# Patient Record
Sex: FEMALE | URBAN - METROPOLITAN AREA
[De-identification: names, ages, dates, MRNs, and addresses within clinical notes are randomized per-mention and may not be internally consistent; named-entity substitution may affect disease eponyms.]

---

## 2018-04-18 ENCOUNTER — RECORDS - HEALTHEAST (OUTPATIENT)
Dept: LAB | Facility: CLINIC | Age: 73
End: 2018-04-18

## 2018-04-19 LAB
ANION GAP SERPL CALCULATED.3IONS-SCNC: 8 MMOL/L (ref 5–18)
BUN SERPL-MCNC: 20 MG/DL (ref 8–28)
CALCIUM SERPL-MCNC: 9.1 MG/DL (ref 8.5–10.5)
CHLORIDE BLD-SCNC: 104 MMOL/L (ref 98–107)
CO2 SERPL-SCNC: 28 MMOL/L (ref 22–31)
CREAT SERPL-MCNC: 0.91 MG/DL (ref 0.6–1.1)
GFR SERPL CREATININE-BSD FRML MDRD: 60 ML/MIN/1.73M2
GLUCOSE BLD-MCNC: 102 MG/DL (ref 70–125)
POTASSIUM BLD-SCNC: 3.5 MMOL/L (ref 3.5–5)
SODIUM SERPL-SCNC: 140 MMOL/L (ref 136–145)

## 2018-04-20 ENCOUNTER — RECORDS - HEALTHEAST (OUTPATIENT)
Dept: LAB | Facility: CLINIC | Age: 73
End: 2018-04-20

## 2018-04-23 LAB
ANION GAP SERPL CALCULATED.3IONS-SCNC: 10 MMOL/L (ref 5–18)
BUN SERPL-MCNC: 11 MG/DL (ref 8–28)
CALCIUM SERPL-MCNC: 9.2 MG/DL (ref 8.5–10.5)
CHLORIDE BLD-SCNC: 100 MMOL/L (ref 98–107)
CO2 SERPL-SCNC: 29 MMOL/L (ref 22–31)
CREAT SERPL-MCNC: 0.82 MG/DL (ref 0.6–1.1)
ERYTHROCYTE [DISTWIDTH] IN BLOOD BY AUTOMATED COUNT: 13.9 % (ref 11–14.5)
GFR SERPL CREATININE-BSD FRML MDRD: >60 ML/MIN/1.73M2
GLUCOSE BLD-MCNC: 91 MG/DL (ref 70–125)
HCT VFR BLD AUTO: 35.6 % (ref 35–47)
HGB BLD-MCNC: 10.8 G/DL (ref 12–16)
MCH RBC QN AUTO: 27.3 PG (ref 27–34)
MCHC RBC AUTO-ENTMCNC: 30.3 G/DL (ref 32–36)
MCV RBC AUTO: 90 FL (ref 80–100)
PLATELET # BLD AUTO: 438 THOU/UL (ref 140–440)
PMV BLD AUTO: 9.9 FL (ref 8.5–12.5)
POTASSIUM BLD-SCNC: 5 MMOL/L (ref 3.5–5)
RBC # BLD AUTO: 3.96 MILL/UL (ref 3.8–5.4)
SODIUM SERPL-SCNC: 139 MMOL/L (ref 136–145)
WBC: 10.4 THOU/UL (ref 4–11)

## 2018-04-27 ENCOUNTER — RECORDS - HEALTHEAST (OUTPATIENT)
Dept: LAB | Facility: CLINIC | Age: 73
End: 2018-04-27

## 2018-04-27 LAB
ANION GAP SERPL CALCULATED.3IONS-SCNC: 13 MMOL/L (ref 5–18)
BASOPHILS # BLD AUTO: 0.1 THOU/UL (ref 0–0.2)
BASOPHILS NFR BLD AUTO: 0 % (ref 0–2)
BUN SERPL-MCNC: 11 MG/DL (ref 8–28)
CALCIUM SERPL-MCNC: 9.2 MG/DL (ref 8.5–10.5)
CHLORIDE BLD-SCNC: 101 MMOL/L (ref 98–107)
CO2 SERPL-SCNC: 23 MMOL/L (ref 22–31)
CREAT SERPL-MCNC: 0.86 MG/DL (ref 0.6–1.1)
EOSINOPHIL # BLD AUTO: 0.2 THOU/UL (ref 0–0.4)
EOSINOPHIL NFR BLD AUTO: 2 % (ref 0–6)
ERYTHROCYTE [DISTWIDTH] IN BLOOD BY AUTOMATED COUNT: 14.2 % (ref 11–14.5)
GFR SERPL CREATININE-BSD FRML MDRD: >60 ML/MIN/1.73M2
GLUCOSE BLD-MCNC: 96 MG/DL (ref 70–125)
HCT VFR BLD AUTO: 38.5 % (ref 35–47)
HGB BLD-MCNC: 11.5 G/DL (ref 12–16)
LYMPHOCYTES # BLD AUTO: 1.3 THOU/UL (ref 0.8–4.4)
LYMPHOCYTES NFR BLD AUTO: 11 % (ref 20–40)
MCH RBC QN AUTO: 27.2 PG (ref 27–34)
MCHC RBC AUTO-ENTMCNC: 29.9 G/DL (ref 32–36)
MCV RBC AUTO: 91 FL (ref 80–100)
MONOCYTES # BLD AUTO: 1.1 THOU/UL (ref 0–0.9)
MONOCYTES NFR BLD AUTO: 10 % (ref 2–10)
NEUTROPHILS # BLD AUTO: 8.9 THOU/UL (ref 2–7.7)
NEUTROPHILS NFR BLD AUTO: 77 % (ref 50–70)
PLATELET # BLD AUTO: 473 THOU/UL (ref 140–440)
PMV BLD AUTO: 10.2 FL (ref 8.5–12.5)
POTASSIUM BLD-SCNC: 3.9 MMOL/L (ref 3.5–5)
RBC # BLD AUTO: 4.23 MILL/UL (ref 3.8–5.4)
SODIUM SERPL-SCNC: 137 MMOL/L (ref 136–145)
WBC: 11.6 THOU/UL (ref 4–11)

## 2020-09-08 ENCOUNTER — HOSPITAL ENCOUNTER (EMERGENCY)
Facility: CLINIC | Age: 75
Discharge: HOME | End: 2020-09-09
Attending: FAMILY MEDICINE | Admitting: FAMILY MEDICINE
Payer: MEDICARE

## 2020-09-08 VITALS
TEMPERATURE: 97.4 F | OXYGEN SATURATION: 93 % | HEART RATE: 77 BPM | RESPIRATION RATE: 16 BRPM | DIASTOLIC BLOOD PRESSURE: 80 MMHG | SYSTOLIC BLOOD PRESSURE: 157 MMHG

## 2020-09-08 DIAGNOSIS — G89.4 CHRONIC PAIN SYNDROME: ICD-10-CM

## 2020-09-08 DIAGNOSIS — M25.571 PAIN IN JOINT, ANKLE AND FOOT, RIGHT: ICD-10-CM

## 2020-09-08 DIAGNOSIS — F32.1 CURRENT MODERATE EPISODE OF MAJOR DEPRESSIVE DISORDER, UNSPECIFIED WHETHER RECURRENT (H): ICD-10-CM

## 2020-09-08 DIAGNOSIS — F43.0 ACUTE REACTION TO STRESS: ICD-10-CM

## 2020-09-08 LAB
ALBUMIN UR-MCNC: 100 MG/DL
APPEARANCE UR: CLEAR
BILIRUB UR QL STRIP: NEGATIVE
COLOR UR AUTO: YELLOW
GLUCOSE UR STRIP-MCNC: NEGATIVE MG/DL
HGB UR QL STRIP: NEGATIVE
KETONES UR STRIP-MCNC: NEGATIVE MG/DL
LEUKOCYTE ESTERASE UR QL STRIP: ABNORMAL
NITRATE UR QL: NEGATIVE
PH UR STRIP: 6 PH (ref 5–7)
RBC #/AREA URNS AUTO: 0 /HPF (ref 0–2)
SOURCE: ABNORMAL
SP GR UR STRIP: 1.01 (ref 1–1.03)
UROBILINOGEN UR STRIP-MCNC: 0 MG/DL (ref 0–2)
WBC #/AREA URNS AUTO: 6 /HPF (ref 0–5)

## 2020-09-08 PROCEDURE — 99285 EMERGENCY DEPT VISIT HI MDM: CPT | Mod: 25 | Performed by: FAMILY MEDICINE

## 2020-09-08 PROCEDURE — 25000132 ZZH RX MED GY IP 250 OP 250 PS 637: Mod: GY | Performed by: FAMILY MEDICINE

## 2020-09-08 PROCEDURE — 81001 URINALYSIS AUTO W/SCOPE: CPT | Performed by: FAMILY MEDICINE

## 2020-09-08 PROCEDURE — 25000128 H RX IP 250 OP 636: Performed by: FAMILY MEDICINE

## 2020-09-08 PROCEDURE — 96372 THER/PROPH/DIAG INJ SC/IM: CPT | Mod: 59 | Performed by: FAMILY MEDICINE

## 2020-09-08 PROCEDURE — 90791 PSYCH DIAGNOSTIC EVALUATION: CPT

## 2020-09-08 PROCEDURE — 99285 EMERGENCY DEPT VISIT HI MDM: CPT | Mod: Z6 | Performed by: FAMILY MEDICINE

## 2020-09-08 RX ORDER — LIDOCAINE 4 G/G
1 PATCH TOPICAL ONCE
Status: DISCONTINUED | OUTPATIENT
Start: 2020-09-08 | End: 2020-09-09 | Stop reason: HOSPADM

## 2020-09-08 RX ORDER — LORAZEPAM 0.5 MG/1
0.5 TABLET ORAL ONCE
Status: COMPLETED | OUTPATIENT
Start: 2020-09-08 | End: 2020-09-08

## 2020-09-08 RX ORDER — HYDROMORPHONE HYDROCHLORIDE 2 MG/1
2 TABLET ORAL
Status: DISCONTINUED | OUTPATIENT
Start: 2020-09-08 | End: 2020-09-09 | Stop reason: HOSPADM

## 2020-09-08 RX ORDER — AMLODIPINE BESYLATE 5 MG/1
10 TABLET ORAL ONCE
Status: COMPLETED | OUTPATIENT
Start: 2020-09-08 | End: 2020-09-08

## 2020-09-08 RX ORDER — HYDROMORPHONE HYDROCHLORIDE 2 MG/1
2 TABLET ORAL ONCE
Status: COMPLETED | OUTPATIENT
Start: 2020-09-08 | End: 2020-09-08

## 2020-09-08 RX ADMIN — LORAZEPAM 0.5 MG: 0.5 TABLET ORAL at 19:44

## 2020-09-08 RX ADMIN — HYDROMORPHONE HYDROCHLORIDE 1 MG: 1 INJECTION, SOLUTION INTRAMUSCULAR; INTRAVENOUS; SUBCUTANEOUS at 19:46

## 2020-09-08 RX ADMIN — HYDROMORPHONE HYDROCHLORIDE 2 MG: 2 TABLET ORAL at 22:31

## 2020-09-08 RX ADMIN — LIDOCAINE 1 PATCH: 560 PATCH PERCUTANEOUS; TOPICAL; TRANSDERMAL at 19:46

## 2020-09-08 RX ADMIN — LORAZEPAM 0.5 MG: 0.5 TABLET ORAL at 23:10

## 2020-09-08 RX ADMIN — HYDROMORPHONE HYDROCHLORIDE 2 MG: 2 TABLET ORAL at 23:10

## 2020-09-08 RX ADMIN — AMLODIPINE BESYLATE 10 MG: 5 TABLET ORAL at 19:44

## 2020-09-08 NOTE — ED AVS SNAPSHOT
[facility] Emergency Department  [address]   [place] [address]  Phone:  [phone]  Fax:  [phone]                                    Kassie Taylor   MRN: [id number]    Department:  [facility] Emergency Department   Date of Visit:  9/8/2020           After Visit Summary Signature Page    I have received my discharge instructions, and my questions have been answered. I have discussed any challenges I see with this plan with the nurse or doctor.    ..........................................................................................................................................  Patient/Patient Representative Signature      ..........................................................................................................................................  Patient Representative Print Name and Relationship to Patient    ..................................................               ................................................  Date                                   Time    ..........................................................................................................................................  Reviewed by Signature/Title    ...................................................              ..............................................  Date                                               Time          22EPIC Rev 08/18

## 2020-09-09 PROCEDURE — 25000128 H RX IP 250 OP 636: Performed by: FAMILY MEDICINE

## 2020-09-09 PROCEDURE — 96374 THER/PROPH/DIAG INJ IV PUSH: CPT | Performed by: FAMILY MEDICINE

## 2020-09-09 RX ORDER — OLANZAPINE 10 MG/2ML
5 INJECTION, POWDER, FOR SOLUTION INTRAMUSCULAR
Status: COMPLETED | OUTPATIENT
Start: 2020-09-09 | End: 2020-09-09

## 2020-09-09 RX ADMIN — OLANZAPINE 5 MG: 10 INJECTION, POWDER, LYOPHILIZED, FOR SOLUTION INTRAMUSCULAR at 00:03

## 2020-09-09 NOTE — ED NOTES
"Pt daughter Lin called to state pt is on a \"72 hour hold.\" Lin notified pt is not on a 72 hour hold and has been assessed by our provider. Lin notified pt was instructed by Dr. Hodge to call family/friends for a ride home and to stay the night there. Daughter also notified pt would have to go back to the facility if no one is able to take her. Lin said \"ok, well good luck with that.\" Dr. Hodge notified.  "

## 2020-09-09 NOTE — ED TRIAGE NOTES
Pt arrives to ED from [facility] in [place], She is an [facility] Pt but [place] is on [address]. She Was admitted to [facility] because her daughter who was caring for her could not care for her due to the aggression and the  Combativeness.Today had rammed staff with her wheelchair and later threatened to harm staff and her family. Then she threatened to run out into the traffic and get ran over. Patient's airway, breathing, circulation, and disability/mental status (ABCDs) intact/WDL during triage. Her daughter is in the lobby.

## 2020-09-09 NOTE — DISCHARGE INSTRUCTIONS
We have no other option but to have you return to the nursing home.   You can work with your family and  to move back to [facility].  Ice the ankle frequently, use your ankle splint to help with pain and walking.  Follow-up with your primary care provider within the next 3 days for further treatment of your ankle, and back pain.

## 2020-09-09 NOTE — ED NOTES
DEC assessment at this time.  Pillows placed under pt's right ankle and knee.  Additional warm blanket provided.

## 2020-09-09 NOTE — ED NOTES
Pt requesting to use the bedpan.  Pt able to void on the bedpan.  Pt questioning the use of oral pain medication, explained the provider ordered oral vs injection.  Contacted [facility] about potential for pt to return, explained pt has been seen by an MD and assessed by a mental health provider.  Nurse at the nursing home stated that she needed to contact administration and would call the hospital back.

## 2020-09-09 NOTE — ED PROVIDER NOTES
[facility] ED Provider Note   Patient: Kassie Taylor  MRN #:  [id number]  Date of Visit: September 8, 2020    CC:     Chief Complaint   Patient presents with     Aggressive Behavior     HPI:  Kassie Taylor is a 75 year old female who presented to the emergency department by EMS from [facility] in [place].  Patient states that she has been a resident there for the last 1-2 months, and was transferred from another nursing home facility following her her stroke 9 months ago.  Patient was supposed to go to the [facility], but they were on divert.  Patient had a history of hemiplegia and hemiparesis following cerebral infarction affecting the right dominant side with onset dated 8/120.  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  Patient states that she is here in the emergency department because she was aggravated after fighting with her daughter all day.  She made some statements that she wanted to run out onto the highway, but did not have any intention of hurting herself.  Her records indicate that she also made some homicidal threats to staff.  Patient was transferred to the emergency department due to aggressive behavior.  She states that her son who lives in [place] is on his way up to [place] to get her in to bring her back to live with him.  Patient reports current symptoms of headache and back pain.  She is asking for  her blood pressure medication that she takes in the evening.    Problem List:  There are no active problems to display for this patient.      No past medical history on file.    MEDS: No current outpatient medications on file.      ALLERGIES:    Allergies   Allergen Reactions     Cholera Vaccines      Latex        No past surgical history on file.    Social History     Tobacco Use     Smoking status: Not on file   Substance Use Topics     Alcohol use: Not on file     Drug use: Not on file         Review of Systems   Except as noted in HPI, all other systems were reviewed and are negative    Physical Exam   Vitals were reviewed  Patient Vitals for the past 12 hrs:   BP Temp Temp src Pulse Resp SpO2   09/08/20 2130 (!) 157/80 -- -- 77 -- --   09/08/20 2100 (!) 153/67 -- -- 76 -- --   09/08/20 2030 (!) 155/75 -- -- 81 -- --   09/08/20 2000 (!) 177/99 -- -- 84 -- 93 %   09/08/20 1945 (!) 210/89 -- -- -- -- 94 %   09/08/20 1930 (!) 196/99 97.4  F (36.3  C) Oral 88 16 94 %     GENERAL APPEARANCE: Alert, hard of hearing, patient appears aggravated  FACE: normal facies  EYES: Pupils are equal  HENT: normal external exam  NECK: no adenopathy or asymmetry  RESP: normal respiratory effort; clear breath sounds bilaterally  CV: regular rate and rhythm; no significant murmurs, gallops or rubs  ABD: soft, no tenderness; no rebound or guarding; bowel sounds are normal  MS: no gross deformities noted; normal muscle tone.  EXT: No calf tenderness or pitting edema  SKIN: no worrisome rash  NEURO: no facial droop;   PSYCH: She denies any suicidal or homicidal plans.  She admits to making statements to run out on the street and is currently angry at her daughter.      Available Lab/Imaging Results     Results for orders placed or performed during the hospital encounter of 09/08/20 (from the past 24 hour(s))   UA reflex to Microscopic   Result Value Ref Range    Color Urine Yellow     Appearance Urine Clear     Glucose Urine Negative  NEG^Negative mg/dL    Bilirubin Urine Negative NEG^Negative    Ketones Urine Negative NEG^Negative mg/dL    Specific Gravity Urine 1.011 1.003 - 1.035    Blood Urine Negative NEG^Negative    pH Urine 6.0 5.0 - 7.0 pH    Protein Albumin Urine 100 (A) NEG^Negative mg/dL    Urobilinogen mg/dL 0.0 0.0 - 2.0 mg/dL    Nitrite Urine Negative NEG^Negative    Leukocyte Esterase Urine Trace (A) NEG^Negative    Source Midstream Urine     RBC Urine 0 0 - 2 /HPF    WBC Urine 6 (H) 0 - 5 /HPF              Impression     Final diagnoses:   Acute reaction to stress   Chronic pain syndrome   Current moderate episode of major depressive disorder   Pain in joint, ankle and foot, right         ED Course & Medical Decision Making   Kassie Taylor is a 75 year old female who presented to the emergency department by EMS from [facility] in [place].  Patient apparently was fighting with her daughter all day, and became very angry and upset, and threatened to run out on the road and get hit.  She has a history of stroke and had some hemiplegia and hemiparesis which she has recovered from 9 months ago.  She is able to get around with a 4 wheeled walker.  Recently she has had some pain and swelling in her right ankle but she has been pushing through the pain and trying to ambulate.  Patient wanted to go home with her daughter today from the nursing home and when she found out that she could not, became even more upset and apparently threatening staff.  She denied this allegation.  She did admit to threatening to run out onto the highway but does not have any intention of hurting herself.  Patient was complaining of a headache, had increased back and ankle pain and she was given a dose of Dilaudid 1 mg IM in the ED.  She also received Ativan 0.5 mg orally.  Patient had ice applied to her ankle, and subsequently an Aircast.  Patient had an evaluation through the DEC (diagnostic evaluation Center), and it was determined  that she was not a candidate for inpatient treatment.  She did not have any ongoing thoughts of hurting herself and she was more angry and upset.  Patient may have an underlying personality disorder.  The patient's daughter was also contacted and interviewed, and it appears that the patient's son from [place] is not coming up to get her.  We contacted the nursing home and they are able to take her back.  We tried to make arrangements for her friends in [facility] to come get her but there was no one that would be willing to come and get her and be responsible for her.  Patient was given additional Dilaudid p.o. for pain, and another dose of Ativan for her agitation.  Patient became more agitated after she was informed that she had to go back to the nursing home.  There were no other options for her and we contacted the nursing home to make sure that they could take her back.  I spoke briefly with the patient's daughter.  Patient was still very defiant and we offered her a shot for anxiety and pain.  Patient received Zyprexa 5 mg IM and was agreeable to return at this time.  She can work with the nursing home to go back to [facility] in [place], but we are unable to make any arrangements tonight.    Patient was cooperative getting on the ambulance cot to return to [facility]..  Follow-up with her primary care provider in 2-3 days for further management of her chronic pain.        Medications   Lidocaine (LIDOCARE) 4 % Patch 1 patch (1 patch Transdermal Patch/Med Applied 9/8/20 1946)   HYDROmorphone (DILAUDID) tablet 2 mg (2 mg Oral Given 9/8/20 2231)   amLODIPine (NORVASC) tablet 10 mg (10 mg Oral Given 9/8/20 1944)   LORazepam (ATIVAN) tablet 0.5 mg (0.5 mg Oral Given 9/8/20 1944)   HYDROmorphone (DILAUDID) injection 1 mg (1 mg Intramuscular Given 9/8/20 1946)   LORazepam (ATIVAN) tablet 0.5 mg (0.5 mg Oral Given 9/8/20 2310)   HYDROmorphone (DILAUDID) tablet 2 mg (2 mg Oral Given 9/8/20  2310)   OLANZapine (zyPREXA) injection 5 mg (5 mg Intramuscular Given 9/9/20 0003)            Written after-visit summary and instructions were given at the time of discharge.    Follow up Plan:   Benjamin Bloom  [facility]  [address] [address]  [phone]    In 2 days        Discharge Instructions:   We have no other option but to have you return to the nursing home.   You can work with your family and  to move to another nursing home if you like.  Ice the ankle frequently, use your ankle splint to help with pain and walking.  Follow-up with your primary care provider within the next 3 days for further treatment of your ankle, and back pain.       Disclaimer: This note consists of words and symbols derived from keyboarding and dictation using voice recognition software.  As a result, there may be errors that have gone undetected.  Please consider this when interpreting information found in this note.       Belinda Hodge MD  09/09/20 0043

## 2020-09-09 NOTE — ED NOTES
Spoke with Melania Horton at [facility].  She stated that they will take pt back at the facility.  Updated provider.  Additional pain medication provided and Tech in the room putting an aircast on pt's right ankle.  Provider will speak with pt about her returning to the facility.

## 2020-09-09 NOTE — ED NOTES
"Provider in and spoke with pt about her returning to the nursing home.  Pt requested that we try one more time for her to go elsewhere.  Provider spoke with her daughter, no where else.  Pt requested a \"shot\" before leaving, orders received.  Administered medication.  Pt then started asking EMS if they would stop of [employer] and discussing food.  Called and updated Melania Horton that pt is returning to the facility.  Informed her that pt was administered 5 mg Zyprexa prior to discharge and that paperwork was in the envelope with EMS.  Also let her know all the medication that was given at the hospital and about the splint on the right ankle.  No additional questions by nursing home staff.  Pt complainant with discharge.    "

## 2021-08-24 ENCOUNTER — LAB REQUISITION (OUTPATIENT)
Dept: LAB | Facility: CLINIC | Age: 76
End: 2021-08-24
Payer: COMMERCIAL

## 2021-08-24 DIAGNOSIS — N18.30 CHRONIC KIDNEY DISEASE, STAGE 3 UNSPECIFIED (H): ICD-10-CM

## 2021-08-24 DIAGNOSIS — Z51.81 ENCOUNTER FOR THERAPEUTIC DRUG LEVEL MONITORING: ICD-10-CM

## 2021-08-24 DIAGNOSIS — G89.29 OTHER CHRONIC PAIN: ICD-10-CM

## 2021-08-25 LAB
ALT SERPL W P-5'-P-CCNC: 17 U/L (ref 0–50)
ANION GAP SERPL CALCULATED.3IONS-SCNC: 4 MMOL/L (ref 3–14)
AST SERPL W P-5'-P-CCNC: 18 U/L (ref 0–45)
BUN SERPL-MCNC: 27 MG/DL (ref 7–30)
CALCIUM SERPL-MCNC: 8.3 MG/DL (ref 8.5–10.1)
CHLORIDE BLD-SCNC: 110 MMOL/L (ref 94–109)
CO2 SERPL-SCNC: 27 MMOL/L (ref 20–32)
CREAT SERPL-MCNC: 1.58 MG/DL (ref 0.52–1.04)
ERYTHROCYTE [DISTWIDTH] IN BLOOD BY AUTOMATED COUNT: 15.3 % (ref 10–15)
GFR SERPL CREATININE-BSD FRML MDRD: 31 ML/MIN/1.73M2
GLUCOSE BLD-MCNC: 76 MG/DL (ref 70–99)
HCT VFR BLD AUTO: 31.9 % (ref 35–47)
HGB BLD-MCNC: 9.6 G/DL (ref 11.7–15.7)
MCH RBC QN AUTO: 28.5 PG (ref 26.5–33)
MCHC RBC AUTO-ENTMCNC: 30.1 G/DL (ref 31.5–36.5)
MCV RBC AUTO: 95 FL (ref 78–100)
PLATELET # BLD AUTO: 312 10E3/UL (ref 150–450)
POTASSIUM BLD-SCNC: 3.8 MMOL/L (ref 3.4–5.3)
RBC # BLD AUTO: 3.37 10E6/UL (ref 3.8–5.2)
SODIUM SERPL-SCNC: 141 MMOL/L (ref 133–144)
WBC # BLD AUTO: 8.9 10E3/UL (ref 4–11)

## 2021-08-25 PROCEDURE — 80048 BASIC METABOLIC PNL TOTAL CA: CPT | Mod: ORL | Performed by: FAMILY MEDICINE

## 2021-08-25 PROCEDURE — 84460 ALANINE AMINO (ALT) (SGPT): CPT | Mod: ORL | Performed by: FAMILY MEDICINE

## 2021-08-25 PROCEDURE — 36415 COLL VENOUS BLD VENIPUNCTURE: CPT | Mod: ORL | Performed by: FAMILY MEDICINE

## 2021-08-25 PROCEDURE — 85027 COMPLETE CBC AUTOMATED: CPT | Mod: ORL | Performed by: FAMILY MEDICINE

## 2021-08-25 PROCEDURE — 84450 TRANSFERASE (AST) (SGOT): CPT | Mod: ORL | Performed by: FAMILY MEDICINE
